# Patient Record
Sex: MALE | HISPANIC OR LATINO | Employment: PART TIME | URBAN - METROPOLITAN AREA
[De-identification: names, ages, dates, MRNs, and addresses within clinical notes are randomized per-mention and may not be internally consistent; named-entity substitution may affect disease eponyms.]

---

## 2024-02-15 ENCOUNTER — OFFICE VISIT (OUTPATIENT)
Dept: FAMILY MEDICINE | Facility: CLINIC | Age: 59
End: 2024-02-15

## 2024-02-15 VITALS
WEIGHT: 148.4 LBS | RESPIRATION RATE: 16 BRPM | BODY MASS INDEX: 25.33 KG/M2 | DIASTOLIC BLOOD PRESSURE: 83 MMHG | OXYGEN SATURATION: 98 % | HEART RATE: 70 BPM | SYSTOLIC BLOOD PRESSURE: 123 MMHG | TEMPERATURE: 97.8 F | HEIGHT: 64 IN

## 2024-02-15 DIAGNOSIS — Z56.9 ADVERSE EFFECTS OF WORK ENVIRONMENT: Primary | ICD-10-CM

## 2024-02-15 LAB
CHOLEST SERPL-MCNC: 227 MG/DL
FASTING STATUS PATIENT QL REPORTED: YES
HBA1C MFR BLD: 5.7 %
HDLC SERPL-MCNC: 54 MG/DL
LDLC SERPL CALC-MCNC: 143 MG/DL
NONHDLC SERPL-MCNC: 173 MG/DL
TRIGL SERPL-MCNC: 152 MG/DL

## 2024-02-15 PROCEDURE — 83036 HEMOGLOBIN GLYCOSYLATED A1C: CPT

## 2024-02-15 PROCEDURE — 80061 LIPID PANEL: CPT

## 2024-02-15 SDOH — ECONOMIC STABILITY - INCOME SECURITY: UNSPECIFIED PROBLEMS RELATED TO EMPLOYMENT: Z56.9

## 2024-02-16 NOTE — PROGRESS NOTES
MEDICINE NOTE    SUBJECTIVE:  The patient is a 58 year old man with no relevant past medical history and a family history of diabetes who presents with a 15 day history of dizziness, headache, itchy throat and itchy eyes. His symptoms all started 15 days ago around the same time. He indicates the lower occipital portion of his head hurts and it radiates to his upper back and only hurts when he is laying down. He feels dizzy only when he moves from laying down to standing up. He feels like he has a piece of dust stuck in his throat. His eyes also itch and burn as the day goes on. He has lived here for 8 years and works full time as a . He started working on a really ese room in a house a few weeks ago when the symptoms started. He wears a mask when he puts the primer on but not when he is actually painting. The patient drinks 4 cups of coffee per day but does not consume caffeine, alcohol or any other drugs. He endorses having blurry vision and has trouble reading tiny writing. He went to a doctor in [place] many years ago who told him he has heart palpitations and put him on baby aspirin but he has not been taking it consistently.    REVIEW OF SYSTEMS:  Gen: no fevers, night sweats or weight change  Eyes:See HPI, diplopia or red eyes  Ears, Noses, Mouth, Throat: no ringing in ears or hearing change, no epistaxis or nasal discharge, no oral lesions, throat clear  Cardiac: no chest pain, or pain with walking  Lungs: no dyspnea, cough, or shortness of breath  GI: no nausea, vomiting, diarrhea or constipation, no abdominal pain  : no change in urine, hematuria, or sexual dysfunction  Musculoskeletal: no joint or muscle pain or swelling  Skin: no concerning lesions or moles  Neuro: no loss of strength or sensation, no numbness or tingling, no tremor  Endo: no polyuria or polydipsia, no temperature intolerance  Heme/Lymph: no concerning bumps, no bleeding problems  Allergy: no environmental or drug  "allergies  Psych: no depression or anxiety    No past medical history on file.    No past surgical history on file.    No family history on file.    Social History     Socioeconomic History    Marital status:      Spouse name: Not on file    Number of children: Not on file    Years of education: Not on file    Highest education level: Not on file   Occupational History    Not on file   Tobacco Use    Smoking status: Not on file    Smokeless tobacco: Not on file   Substance and Sexual Activity    Alcohol use: Not on file    Drug use: Not on file    Sexual activity: Not on file   Other Topics Concern    Not on file   Social History Narrative    Not on file     Social Determinants of Health     Financial Resource Strain: Not on file   Food Insecurity: Not on file   Transportation Needs: Not on file   Physical Activity: Not on file   Stress: Not on file   Social Connections: Not on file   Interpersonal Safety: Not on file   Housing Stability: Not on file       OBJECTIVE:  Physical Exam:  /83 (BP Location: Left arm, Cuff Size: Adult Regular)   Pulse 70   Temp 97.8  F (36.6  C) (Temporal)   Resp 16   Ht 1.613 m (5' 3.5\")   Wt 67.3 kg (148 lb 6.4 oz)   SpO2 98%   BMI 25.88 kg/m    Constitutional: no distress, comfortable, pleasant   Eyes: anicteric, normal extra-ocular movements    Cardiovascular: regular rate and rhythm, normal S1 and S2, no murmurs, rubs or gallops, peripheral pulses full and symmetric   Respiratory: clear to auscultation, no wheezes or crackles, normal breath sounds   Skin: no concerning lesions, no jaundice       ASSESSMENT/PLAN:   The patient was seen today for an adverse reaction to his work environment. We ran A1c and a lipid panel as part of his health maintenance since he does not regularly go the doctor. We are prescribing loratadine for his allergies and asking him to come back on Monday to  his prescription since there is no pharmacy preceptor [facility]. We are also " advising him to come back in a month for a health maintenance visit so we can start providing him more comprehensive primary care.      Med Clinician: Shefali Harrison  Preceptor: Dr. John Garcia    In supervising the Medical student, Shefali Harrison, I repeated the exam documented above.  I have reviewed and verified the student s documentation.  Patient's symptoms clearly date from entering a new very ese section of the current house they are working on.  We will treat as an environmental exposure.  Also consider a statin and will  on slightly elevated  A1C.  Supervising Provider: John Garcia MD

## 2024-02-16 NOTE — NURSING NOTE
"[facility] Nursing Progress Note    Chief complaint   Patient presents to clinic with postural dizziness, pain surrounding the back of his head and back, throat irritation and itchy eyes. He states that it is as if there is \"dust in throat\". Symptoms all began 15 days ago. He has taken aspirin, but states that it hasn't had an impact on his headache or dizziness.     Social history   Pt spends his days working as a . Pt states he has trouble falling asleep at night.     Vitals   /83 (BP Location: Left arm, Cuff Size: Adult Regular)   Pulse 70   Temp 97.8  F (36.6  C) (Temporal)   Resp 16   Ht 1.613 m (5' 3.5\")   Wt 67.3 kg (148 lb 6.4 oz)   SpO2 98%   BMI 25.88 kg/m        PHQ Score  PHQ2: 0  PHQ9: N/A       Nursing Clinician: Medina Nj  Nursing Preceptor: Cheli Stoll RN       "

## 2024-02-16 NOTE — PATIENT INSTRUCTIONS
Resumen de la Visita    Nombre del Paciente: Pablo Lemus  MRN: [id number]    Fecha de la [place]: 15/02/2024    Dianostico medico principal: Efecto adverso del ambiente de trabajo     Recomendaciones/Instrucciones del Medico: La farmacia tendrá la medicación lista el [place]. [place] Claritin (loratadina) alan vez al día por 30 días. Recomendamos que uses alan máscara mientras pintas en el trabajo.      Estudios de Laboratorio: Prueba de Ha1c y panel de lípidos     Seguimiento/Resultados: Reciberas alan llamada por el telefono en las próxima semana sobre los resultados del laboratorio.    Referencias e Instrucciones: Regrese a [facility] el [place] y tambien en cuatro semanas para la visita de seguimiento.       Medico: Dr. Garcia

## 2024-02-17 RX ORDER — LORATADINE 10 MG/1
10 TABLET ORAL DAILY
COMMUNITY
End: 2024-02-17

## 2024-02-18 NOTE — PROGRESS NOTES
"    [facility] Progress Note    Chief complaint: Dizziness, headache, itchy eyes, itchy throat     Last date of full med: 2/15/24    Subjective:    Patient presents with complaints of dizziness, headaches, itchy eyes, and itchy throat that started 15 days ago. Patient is a 58 year  male who lives alone. Patient consumes 4 cups of coffee per day but denies use of alcohol, tobacco, and recreational drugs. Past medical history includes a surgery to his leg when he got cut with a tile cutter. His mother passed away from what the patient said diabetes and his brother has diabetes and cancer. He has no other health concerns or previous issues.       No current outpatient medications on file.         Objective:  /83 (BP Location: Left arm, Cuff Size: Adult Regular)   Pulse 70   Temp 97.8  F (36.6  C) (Temporal)   Resp 16   Ht 1.613 m (5' 3.5\")   Wt 67.3 kg (148 lb 6.4 oz)   SpO2 98%   BMI 25.88 kg/m      Assessment:     DTP: Needs Additional Therapy: untreated condition   Rationale: patient's dizziness and headaches as well as itchy eyes and throat are currently untreated and need  drug therapy.   Goal of therapy: improve symptoms without causing side effects. An antihistamine would be effective as it appears the symptoms were caused by an environmental irritant.       Plan:  Initiate loratidine 10mg 1 tablet daily   Educate patient on mask wearing while at work to reduce future risk of irritation.       Follow-Up:  Follow up on Monday to dispense prescription and in 1 month to evaluate efficacy and side effects.     Pharmacy Clinician: Karen Bonilla, 2nd year pharmacy 2/17/24  Medical Preceptor: Dr. John Gracia     _____________________________  Preceptor Use Only:  In supervising the student, I have reviewed and verified the student's documentation and found it to be correct and complete. Symptom onset associated with entering a ese new area of work environment.  Preceptor Signature:  John Garcia, " MD

## 2024-06-26 ENCOUNTER — HOSPITAL ENCOUNTER (OUTPATIENT)
Facility: CLINIC | Age: 59
End: 2024-06-26
Attending: INTERNAL MEDICINE | Admitting: INTERNAL MEDICINE

## 2024-06-26 DIAGNOSIS — Z12.11 SPECIAL SCREENING FOR MALIGNANT NEOPLASMS, COLON: Primary | ICD-10-CM

## 2024-06-27 ENCOUNTER — APPOINTMENT (OUTPATIENT)
Dept: INTERPRETER SERVICES | Facility: CLINIC | Age: 59
End: 2024-06-27

## 2024-07-01 ENCOUNTER — APPOINTMENT (OUTPATIENT)
Dept: INTERPRETER SERVICES | Facility: CLINIC | Age: 59
End: 2024-07-01

## 2024-07-01 PROBLEM — Z12.11 SPECIAL SCREENING FOR MALIGNANT NEOPLASMS, COLON: Status: ACTIVE | Noted: 2024-07-01

## 2024-07-01 RX ORDER — POLYETHYLENE GLYCOL 3350 17 G/17G
POWDER, FOR SOLUTION ORAL
Qty: 238 G | Refills: 0 | Status: SHIPPED | OUTPATIENT
Start: 2024-07-01 | End: 2024-07-02 | Stop reason: HOSPADM

## 2024-07-01 RX ORDER — ASPIRIN 81 MG/1
1 TABLET, CHEWABLE ORAL DAILY
COMMUNITY
End: 2024-07-02 | Stop reason: HOSPADM

## 2024-07-01 RX ORDER — BISACODYL 5 MG/1
TABLET, DELAYED RELEASE ORAL
Qty: 4 TABLET | Refills: 0 | Status: SHIPPED | OUTPATIENT
Start: 2024-07-01 | End: 2024-07-02 | Stop reason: HOSPADM

## 2024-07-01 RX ORDER — TAMSULOSIN HYDROCHLORIDE 0.4 MG/1
0.4 CAPSULE ORAL DAILY
COMMUNITY
Start: 2024-05-21 | End: 2024-07-02 | Stop reason: HOSPADM

## 2024-07-01 RX ORDER — GLUCOSA SU 2KCL/CHONDROITIN SU 500-400 MG
1 CAPSULE ORAL DAILY
COMMUNITY
Start: 2024-03-25 | End: 2024-07-02 | Stop reason: HOSPADM

## 2024-07-30 ENCOUNTER — HOSPITAL ENCOUNTER (EMERGENCY)
Facility: HOSPITAL | Age: 59
Discharge: STILL ADMITTED OR ADMINISTRATIVE | End: 2024-07-30

## 2024-07-30 ASSESSMENT — ACTIVITIES OF DAILY LIVING (ADL)
ADLS_ACUITY_SCORE: 33

## 2024-08-02 ENCOUNTER — HOSPITAL ENCOUNTER (OUTPATIENT)
Facility: CLINIC | Age: 59
Discharge: HOME | End: 2024-08-02
Attending: COLON & RECTAL SURGERY | Admitting: COLON & RECTAL SURGERY

## 2024-08-02 VITALS
BODY MASS INDEX: 25.27 KG/M2 | WEIGHT: 148 LBS | RESPIRATION RATE: 32 BRPM | HEART RATE: 60 BPM | SYSTOLIC BLOOD PRESSURE: 93 MMHG | OXYGEN SATURATION: 97 % | DIASTOLIC BLOOD PRESSURE: 73 MMHG | HEIGHT: 64 IN

## 2024-08-02 LAB — COLONOSCOPY: NORMAL

## 2024-08-02 PROCEDURE — G0500 MOD SEDAT ENDO SERVICE >5YRS: HCPCS | Performed by: COLON & RECTAL SURGERY

## 2024-08-02 PROCEDURE — 45378 DIAGNOSTIC COLONOSCOPY: CPT | Performed by: COLON & RECTAL SURGERY

## 2024-08-02 PROCEDURE — 250N000011 HC RX IP 250 OP 636: Performed by: COLON & RECTAL SURGERY

## 2024-08-02 PROCEDURE — G0121 COLON CA SCRN NOT HI RSK IND: HCPCS | Performed by: COLON & RECTAL SURGERY

## 2024-08-02 RX ORDER — FENTANYL CITRATE 50 UG/ML
INJECTION, SOLUTION INTRAMUSCULAR; INTRAVENOUS PRN
Status: DISCONTINUED | OUTPATIENT
Start: 2024-08-02 | End: 2024-08-02 | Stop reason: HOSPADM

## 2024-08-02 RX ORDER — NALOXONE HYDROCHLORIDE 0.4 MG/ML
0.4 INJECTION, SOLUTION INTRAMUSCULAR; INTRAVENOUS; SUBCUTANEOUS
Status: CANCELLED | OUTPATIENT
Start: 2024-08-02

## 2024-08-02 RX ORDER — ONDANSETRON 2 MG/ML
4 INJECTION INTRAMUSCULAR; INTRAVENOUS
Status: CANCELLED | OUTPATIENT
Start: 2024-08-02

## 2024-08-02 RX ORDER — NALOXONE HYDROCHLORIDE 0.4 MG/ML
0.2 INJECTION, SOLUTION INTRAMUSCULAR; INTRAVENOUS; SUBCUTANEOUS
Status: CANCELLED | OUTPATIENT
Start: 2024-08-02

## 2024-08-02 RX ORDER — FLUMAZENIL 0.1 MG/ML
0.2 INJECTION, SOLUTION INTRAVENOUS
Status: CANCELLED | OUTPATIENT
Start: 2024-08-02 | End: 2024-08-03

## 2024-08-02 RX ORDER — PROCHLORPERAZINE MALEATE 10 MG
10 TABLET ORAL EVERY 6 HOURS PRN
Status: CANCELLED | OUTPATIENT
Start: 2024-08-02

## 2024-08-02 RX ORDER — ONDANSETRON 4 MG/1
4 TABLET, ORALLY DISINTEGRATING ORAL EVERY 6 HOURS PRN
Status: CANCELLED | OUTPATIENT
Start: 2024-08-02

## 2024-08-02 RX ORDER — CRANBERRY FRUIT EXTRACT 650 MG
1 CAPSULE ORAL DAILY
COMMUNITY

## 2024-08-02 RX ORDER — DIPHENHYDRAMINE HCL 25 MG
25 TABLET ORAL EVERY 6 HOURS PRN
COMMUNITY

## 2024-08-02 RX ORDER — ASPIRIN 81 MG/1
81 TABLET, CHEWABLE ORAL DAILY
COMMUNITY

## 2024-08-02 RX ORDER — LIDOCAINE 40 MG/G
CREAM TOPICAL
Status: CANCELLED | OUTPATIENT
Start: 2024-08-02

## 2024-08-02 RX ORDER — ONDANSETRON 2 MG/ML
4 INJECTION INTRAMUSCULAR; INTRAVENOUS EVERY 6 HOURS PRN
Status: CANCELLED | OUTPATIENT
Start: 2024-08-02

## 2024-08-02 ASSESSMENT — ACTIVITIES OF DAILY LIVING (ADL)
ADLS_ACUITY_SCORE: 35
ADLS_ACUITY_SCORE: 35

## 2024-08-02 NOTE — H&P
"Pre-Endoscopy History and Physical     Pablo Lemus MRN# [id number]   YOB: 1965 Age: 58 year old     Date of Procedure: 8/2/2024  Primary care provider: No Ref-Primary, Physician  Type of Endoscopy: Colonoscopy  Reason for Procedure: Screening  Type of Anesthesia Anticipated: Moderate Sedation    HPI:    Pablo Mullen is a 58 year old male who will be undergoing the above procedure.      A history and physical has been performed. The patient's medications and allergies have been reviewed. The risks and benefits of the procedure and the sedation options and risks were discussed with the patient.  All questions were answered and informed consent was obtained.      He denies a personal or family history of anesthesia complications or bleeding disorders.     No Known Allergies     No current facility-administered medications for this encounter.       Patient Active Problem List   Diagnosis    Special screening for malignant neoplasms, colon        History reviewed. No pertinent past medical history.     History reviewed. No pertinent surgical history.    Social History     Tobacco Use    Smoking status: Never    Smokeless tobacco: Never   Substance Use Topics    Alcohol use: Not Currently       Family History   Problem Relation Age of Onset    Colon Cancer No family hx of        REVIEW OF SYSTEMS:     5 point ROS negative except as noted above in HPI, including Gen., Resp., CV, GI &  system review.      PHYSICAL EXAM:   /82   Pulse 50   Resp 16   Ht 1.613 m (5' 3.5\")   Wt 67.1 kg (148 lb)   SpO2 98%   BMI 25.81 kg/m   Estimated body mass index is 25.81 kg/m  as calculated from the following:    Height as of this encounter: 1.613 m (5' 3.5\").    Weight as of this encounter: 67.1 kg (148 lb).   GENERAL APPEARANCE: healthy and alert  MENTAL STATUS: alert  AIRWAY EXAM: Mallampatti Class I (visualization of the soft palate, fauces, uvula, anterior and posterior pillars)  RESP: lungs clear " to auscultation - no rales, rhonchi or wheezes  CV: regular rates and rhythm      IMPRESSION   ASA Class 2 - Mild systemic disease        PLAN:     Plan for colonoscopy. We discussed the risks, benefits and alternatives and the patient wished to proceed.    The above has been forwarded to the consulting provider.      Gay Stanley MD  [facility]  Phone: [phone]  Fax: [phone]  August 2, 2024

## (undated) RX ORDER — FENTANYL CITRATE 50 UG/ML
INJECTION, SOLUTION INTRAMUSCULAR; INTRAVENOUS
Status: DISPENSED
Start: 2024-08-02